# Patient Record
Sex: MALE | Race: WHITE | NOT HISPANIC OR LATINO
[De-identification: names, ages, dates, MRNs, and addresses within clinical notes are randomized per-mention and may not be internally consistent; named-entity substitution may affect disease eponyms.]

---

## 2022-06-15 ENCOUNTER — NON-APPOINTMENT (OUTPATIENT)
Age: 32
End: 2022-06-15

## 2022-06-15 PROBLEM — Z00.00 ENCOUNTER FOR PREVENTIVE HEALTH EXAMINATION: Status: ACTIVE | Noted: 2022-06-15

## 2022-06-16 ENCOUNTER — NON-APPOINTMENT (OUTPATIENT)
Age: 32
End: 2022-06-16

## 2022-06-16 ENCOUNTER — APPOINTMENT (OUTPATIENT)
Dept: INTERNAL MEDICINE | Facility: CLINIC | Age: 32
End: 2022-06-16
Payer: COMMERCIAL

## 2022-06-16 VITALS
WEIGHT: 215 LBS | SYSTOLIC BLOOD PRESSURE: 152 MMHG | DIASTOLIC BLOOD PRESSURE: 90 MMHG | HEART RATE: 82 BPM | HEIGHT: 67 IN | TEMPERATURE: 98 F | OXYGEN SATURATION: 99 % | RESPIRATION RATE: 18 BRPM | BODY MASS INDEX: 33.74 KG/M2

## 2022-06-16 VITALS — SYSTOLIC BLOOD PRESSURE: 142 MMHG | DIASTOLIC BLOOD PRESSURE: 84 MMHG

## 2022-06-16 DIAGNOSIS — F43.10 POST-TRAUMATIC STRESS DISORDER, UNSPECIFIED: ICD-10-CM

## 2022-06-16 PROCEDURE — 99204 OFFICE O/P NEW MOD 45 MIN: CPT

## 2022-06-16 PROCEDURE — 94729 DIFFUSING CAPACITY: CPT

## 2022-06-16 PROCEDURE — 94727 GAS DIL/WSHOT DETER LNG VOL: CPT

## 2022-06-16 PROCEDURE — 94060 EVALUATION OF WHEEZING: CPT

## 2022-06-16 PROCEDURE — ZZZZZ: CPT

## 2022-06-16 RX ORDER — DIAZEPAM 2 MG/1
2 TABLET ORAL
Refills: 0 | Status: ACTIVE | COMMUNITY
Start: 2022-06-16

## 2022-06-16 NOTE — HISTORY OF PRESENT ILLNESS
[FreeTextEntry1] : New PT- Pulmonary  [de-identified] : Patient is a 32- year -old male who presents to office today to establish care as a new pulmonary patient. Patient reports he has a PHM significant for anxiety, depression, and Asthma. PCP Dr. [facility] at [facility]. \par \par Patient reports he was diagnosed with Asthma as a small child. He reports his last asthma exacerbation was in 2019 after having COVID. He reports he has had COVID 3x- 2019, 7/2020, 11/2021. He reports he suffered very mild symptoms with all infections and did not require hospitalization for any of his COVID events. He is vaccinated x2 for COVID. He denies prior hospitalization in past for any reason.\par \par Currently, he is taking singular, Symbicort, and albuterol.  At one point, he reports that his Pulmonologist at [facility] told him to use his albuterol q 4 hours. When he did this, he felt very jittery. He has stopped this and only uses it as needed. He does have 1 dog (service dog) and feels that being around dog triggers his symptoms. He feels singular helps with his seasonal allergies and keeps his wheezing at a minimum. Yesterday, patient admits to some chest tightness while working outdoors. He does not have complaints today.\par \par Patient does admit he smokes medical marijuana several times a week. He has never smoked cigarettes and denies second hand smoke.\par \par Patient currently works in Film industry. Previously in  and does admit exposure to tear gas. He lives in [place].  No international travel in last year or while in . He has been to [place] and [place] (family farm) in the past year.

## 2022-06-16 NOTE — PLAN
[FreeTextEntry1] : 1. Patient will start prednisone taper as written for 10 days. \par \par 2. Patient will continue with Singulair and Symbicort for his maintenance tx. He will use albuterol only when needed and will refrain from using it q 4 hours.\par \par 3. Patient will return to office in 2 months for f/u evaluation\par \par 4. He will notify our office if he is unable to tolerate taper or if his symptoms do not improve\par \par Patient seen and evaluated with Dr. Mosquera. All questions answered

## 2022-06-16 NOTE — PHYSICAL EXAM
[No Acute Distress] : no acute distress [Well Developed] : well developed [No Lymphadenopathy] : no lymphadenopathy [Supple] : supple [No Respiratory Distress] : no respiratory distress  [No Accessory Muscle Use] : no accessory muscle use [Clear to Auscultation] : lungs were clear to auscultation bilaterally [Normal Rate] : normal rate  [Regular Rhythm] : with a regular rhythm [Normal S1, S2] : normal S1 and S2 [No Edema] : there was no peripheral edema [Soft] : abdomen soft [Non Tender] : non-tender [Non-distended] : non-distended [Normal Bowel Sounds] : normal bowel sounds [Normal Gait] : normal gait [Normal Affect] : the affect was normal [Alert and Oriented x3] : oriented to person, place, and time [de-identified] : B/L ear cerumen

## 2022-06-16 NOTE — DATA REVIEWED
[FreeTextEntry1] : PFT reviewed with patient- Patient appears to be mildly obstructed without response to bronchodilation. Mildly restricted volumes noted which may be secondary to his weight and body habitus. Diffusion capacity normal.

## 2022-06-30 ENCOUNTER — TRANSCRIPTION ENCOUNTER (OUTPATIENT)
Age: 32
End: 2022-06-30

## 2022-08-18 ENCOUNTER — APPOINTMENT (OUTPATIENT)
Dept: INTERNAL MEDICINE | Facility: CLINIC | Age: 32
End: 2022-08-18

## 2022-08-18 ENCOUNTER — NON-APPOINTMENT (OUTPATIENT)
Age: 32
End: 2022-08-18

## 2022-08-18 VITALS
TEMPERATURE: 98.1 F | DIASTOLIC BLOOD PRESSURE: 80 MMHG | RESPIRATION RATE: 18 BRPM | OXYGEN SATURATION: 97 % | WEIGHT: 210 LBS | HEIGHT: 67 IN | SYSTOLIC BLOOD PRESSURE: 130 MMHG | HEART RATE: 89 BPM | BODY MASS INDEX: 32.96 KG/M2

## 2022-08-18 DIAGNOSIS — Z23 ENCOUNTER FOR IMMUNIZATION: ICD-10-CM

## 2022-08-18 PROCEDURE — 99214 OFFICE O/P EST MOD 30 MIN: CPT | Mod: 25

## 2022-08-18 PROCEDURE — 94060 EVALUATION OF WHEEZING: CPT

## 2022-08-18 PROCEDURE — 90677 PCV20 VACCINE IM: CPT

## 2022-08-18 PROCEDURE — G0009: CPT

## 2022-08-18 RX ORDER — BUDESONIDE AND FORMOTEROL FUMARATE DIHYDRATE 160; 4.5 UG/1; UG/1
160-4.5 AEROSOL RESPIRATORY (INHALATION) TWICE DAILY
Qty: 3 | Refills: 3 | Status: DISCONTINUED | COMMUNITY
Start: 2022-06-16 | End: 2022-08-18

## 2022-08-18 RX ORDER — PREDNISONE 20 MG/1
20 TABLET ORAL
Qty: 19 | Refills: 0 | Status: DISCONTINUED | COMMUNITY
Start: 2022-06-16 | End: 2022-08-18

## 2022-08-18 NOTE — PLAN
[FreeTextEntry1] : 1. Patient will continue with Symbicort and singular daily. He will continue with albuterol PRN\par \par 2. Patient has completely stopped smoking. He will continue to refrain from this activity\par \par 3. Medications renewed at patients request \par \par 4. Prevnar 20 to be administered now\par \par 5. F/U in 6 months or sooner if needed with Dr. Mosquera

## 2022-08-18 NOTE — PHYSICAL EXAM
[No Acute Distress] : no acute distress [Well Developed] : well developed [No Lymphadenopathy] : no lymphadenopathy [Supple] : supple [No Respiratory Distress] : no respiratory distress  [No Accessory Muscle Use] : no accessory muscle use [Clear to Auscultation] : lungs were clear to auscultation bilaterally [Normal Rate] : normal rate  [Regular Rhythm] : with a regular rhythm [Normal S1, S2] : normal S1 and S2 [No Edema] : there was no peripheral edema [Soft] : abdomen soft [Non Tender] : non-tender [Non-distended] : non-distended [Normal Bowel Sounds] : normal bowel sounds [Normal Gait] : normal gait [Normal Affect] : the affect was normal [Alert and Oriented x3] : oriented to person, place, and time

## 2022-08-18 NOTE — DATA REVIEWED
[FreeTextEntry1] : Normal spirometry noted with response to bronchodilator. Improved from last visit

## 2022-08-18 NOTE — HISTORY OF PRESENT ILLNESS
[FreeTextEntry1] : Pulm F/U [de-identified] : Patient is a 32- year -old male who presents to office today for pulmonary follow up. Patient was recently seen 6/2022 in which he was experiencing some chest tightness. He did not have a structured pulmonary regimen and was over using his albuterol. Patient was placed on short prednisone taper for his symptoms. Patient was also asked to maintain Singulair and Symbicort for his maintenance therapy and use albuterol only when needed. \par \par Patient states he has stopped smoking Marijuana. He has moved his service dog out of the bed. He has not needed to use his albuterol inhaler once. He is feeling overall improved. He has been exercising without difficulty.\par \par The patient does not have any complaints today.

## 2022-09-20 ENCOUNTER — APPOINTMENT (OUTPATIENT)
Dept: INTERNAL MEDICINE | Facility: CLINIC | Age: 32
End: 2022-09-20

## 2022-09-20 DIAGNOSIS — U07.1 COVID-19: ICD-10-CM

## 2022-09-20 PROCEDURE — 99442: CPT

## 2022-09-20 NOTE — HISTORY OF PRESENT ILLNESS
[Home] : at home, [unfilled] , at the time of the visit. [Medical Office: ([facility])___] : at the medical office located in  [Verbal consent obtained from patient] : the patient, [unfilled] [FreeTextEntry8] : Patient is a 32- year -old male who presents to office today for telephonic visit. Patient has PHM significant for anxiety, depression, and asthma.\par \par +COVID 9/20/2022, 4th time getting COVID\par Symptoms started yesterday\par Experiencing PND, achy, fatigued/tired\par Coughing because of PND, clear\par No wheezing\par Meds reviewed: Paxlovid interacts negatively with Symbicort and diazepam\par Patient declined MAB enrollment, wants to treat symptomatically\par Wife at home (9 months preg)\par \par \par

## 2022-09-20 NOTE — PLAN
[FreeTextEntry1] : 1. Start prednisone 20 mg bid x 5 days. Promethazine codeine as needed for cough. To notify if no improvement in 3 days\par \par

## 2023-01-26 ENCOUNTER — NON-APPOINTMENT (OUTPATIENT)
Age: 33
End: 2023-01-26

## 2023-02-17 ENCOUNTER — RX RENEWAL (OUTPATIENT)
Age: 33
End: 2023-02-17

## 2023-05-09 ENCOUNTER — NON-APPOINTMENT (OUTPATIENT)
Age: 33
End: 2023-05-09

## 2023-05-22 ENCOUNTER — APPOINTMENT (OUTPATIENT)
Dept: INTERNAL MEDICINE | Facility: CLINIC | Age: 33
End: 2023-05-22

## 2023-09-08 ENCOUNTER — NON-APPOINTMENT (OUTPATIENT)
Age: 33
End: 2023-09-08

## 2023-09-14 ENCOUNTER — APPOINTMENT (OUTPATIENT)
Dept: INTERNAL MEDICINE | Facility: CLINIC | Age: 33
End: 2023-09-14
Payer: COMMERCIAL

## 2023-09-14 VITALS
WEIGHT: 185 LBS | RESPIRATION RATE: 18 BRPM | BODY MASS INDEX: 29.03 KG/M2 | HEART RATE: 113 BPM | OXYGEN SATURATION: 98 % | HEIGHT: 67 IN | TEMPERATURE: 99.3 F | DIASTOLIC BLOOD PRESSURE: 80 MMHG | SYSTOLIC BLOOD PRESSURE: 120 MMHG

## 2023-09-14 PROCEDURE — 99214 OFFICE O/P EST MOD 30 MIN: CPT

## 2023-09-14 RX ORDER — PREDNISONE 20 MG/1
20 TABLET ORAL
Qty: 10 | Refills: 0 | Status: DISCONTINUED | COMMUNITY
Start: 2022-09-20 | End: 2023-09-14

## 2023-09-14 RX ORDER — PROMETHAZINE HYDROCHLORIDE AND DEXTROMETHORPHAN HYDROBROMIDE ORAL SOLUTION 15; 6.25 MG/5ML; MG/5ML
6.25-15 SOLUTION ORAL
Qty: 300 | Refills: 0 | Status: DISCONTINUED | COMMUNITY
Start: 2023-05-09 | End: 2023-09-14

## 2023-09-14 RX ORDER — ALBUTEROL SULFATE 90 UG/1
108 (90 BASE) INHALANT RESPIRATORY (INHALATION)
Qty: 3 | Refills: 3 | Status: DISCONTINUED | COMMUNITY
Start: 2022-06-13 | End: 2023-09-14

## 2023-09-14 RX ORDER — PROMETHAZINE HYDROCHLORIDE AND CODEINE PHOSPHATE 6.25; 1 MG/5ML; MG/5ML
6.25-1 SOLUTION ORAL
Qty: 300 | Refills: 0 | Status: DISCONTINUED | COMMUNITY
Start: 2022-09-20 | End: 2023-09-14

## 2023-10-26 ENCOUNTER — APPOINTMENT (OUTPATIENT)
Dept: INTERNAL MEDICINE | Facility: CLINIC | Age: 33
End: 2023-10-26

## 2023-11-24 ENCOUNTER — NON-APPOINTMENT (OUTPATIENT)
Age: 33
End: 2023-11-24

## 2023-11-30 ENCOUNTER — TRANSCRIPTION ENCOUNTER (OUTPATIENT)
Age: 33
End: 2023-11-30

## 2024-03-11 ENCOUNTER — RX RENEWAL (OUTPATIENT)
Age: 34
End: 2024-03-11

## 2024-03-11 RX ORDER — ALBUTEROL SULFATE 90 UG/1
108 (90 BASE) INHALANT RESPIRATORY (INHALATION)
Qty: 3 | Refills: 5 | Status: ACTIVE | COMMUNITY
Start: 2023-09-14 | End: 1900-01-01

## 2024-04-04 ENCOUNTER — APPOINTMENT (OUTPATIENT)
Dept: INTERNAL MEDICINE | Facility: CLINIC | Age: 34
End: 2024-04-04
Payer: COMMERCIAL

## 2024-04-04 VITALS
OXYGEN SATURATION: 98 % | RESPIRATION RATE: 18 BRPM | HEIGHT: 67 IN | TEMPERATURE: 98.3 F | WEIGHT: 206 LBS | HEART RATE: 110 BPM | BODY MASS INDEX: 32.33 KG/M2

## 2024-04-04 DIAGNOSIS — R05.9 COUGH, UNSPECIFIED: ICD-10-CM

## 2024-04-04 DIAGNOSIS — J45.40 MODERATE PERSISTENT ASTHMA, UNCOMPLICATED: ICD-10-CM

## 2024-04-04 DIAGNOSIS — J45.909 UNSPECIFIED ASTHMA, UNCOMPLICATED: ICD-10-CM

## 2024-04-04 DIAGNOSIS — J30.2 OTHER SEASONAL ALLERGIC RHINITIS: ICD-10-CM

## 2024-04-04 PROCEDURE — 94729 DIFFUSING CAPACITY: CPT

## 2024-04-04 PROCEDURE — 99215 OFFICE O/P EST HI 40 MIN: CPT | Mod: 25

## 2024-04-04 PROCEDURE — 94060 EVALUATION OF WHEEZING: CPT

## 2024-04-04 PROCEDURE — ZZZZZ: CPT

## 2024-04-04 PROCEDURE — 94727 GAS DIL/WSHOT DETER LNG VOL: CPT

## 2024-04-04 RX ORDER — TIRZEPATIDE 2.5 MG/.5ML
2.5 INJECTION, SOLUTION SUBCUTANEOUS
Refills: 0 | Status: ACTIVE | COMMUNITY

## 2024-04-04 NOTE — PLAN
[FreeTextEntry1] : 1. Continue with medical regimen as outlined above, including Symbicort and Singulair on a daily basis.  2.  Continue with routine medical follow-up with his physicians at the [facility].  3.  Albuterol 30 to 60 minutes prior to exercise and as needed  4.  Follow-up with myself in 1 year with full pulmonary function testing.

## 2024-04-04 NOTE — HISTORY OF PRESENT ILLNESS
[FreeTextEntry1] : The patient presents today for a comprehensive pulmonary evaluation.  [de-identified] : Mr. SERRA is feeling relatively well at this time. The patient has a history of mild persistent asthma, and is maintained on Symbicort and singulair 10 mg daily. There have been no exacerbations as of late.  There has been no cough, sputum production, or wheezing. The patient does report that he uses his albuterol inhaler prior to working out.   The patient reports that he has been exercising 2-3 times per week by doing crossfit. There has been no chest pain, shortness of breath, or palpitations while exercising. He was recently started on a regimen of zepbound 2.5 mg/0.5 mmL, by his physician at the [facility].  He reports that he has lost 20 pounds since starting this medication. There have been no other acute constitutional symptoms. He comes in for this assessment.

## 2024-04-04 NOTE — PHYSICAL EXAM
[No Acute Distress] : no acute distress [Well Nourished] : well nourished [Well Developed] : well developed [Well-Appearing] : well-appearing [Normal Sclera/Conjunctiva] : normal sclera/conjunctiva [EOMI] : extraocular movements intact [Normal Outer Ear/Nose] : the outer ears and nose were normal in appearance [Normal Oropharynx] : the oropharynx was normal [No JVD] : no jugular venous distention [Supple] : supple [No Respiratory Distress] : no respiratory distress  [No Accessory Muscle Use] : no accessory muscle use [Clear to Auscultation] : lungs were clear to auscultation bilaterally [Normal Rate] : normal rate  [Regular Rhythm] : with a regular rhythm [Normal S1, S2] : normal S1 and S2 [No Murmur] : no murmur heard [No Edema] : there was no peripheral edema [No Extremity Clubbing/Cyanosis] : no extremity clubbing/cyanosis [Soft] : abdomen soft [Non Tender] : non-tender [Non-distended] : non-distended [No Masses] : no abdominal mass palpated [No HSM] : no HSM [Normal Bowel Sounds] : normal bowel sounds [Normal Posterior Cervical Nodes] : no posterior cervical lymphadenopathy [Normal Anterior Cervical Nodes] : no anterior cervical lymphadenopathy [No CVA Tenderness] : no CVA  tenderness [No Rash] : no rash [Coordination Grossly Intact] : coordination grossly intact [Normal Gait] : normal gait [Normal Affect] : the affect was normal [Normal Insight/Judgement] : insight and judgment were intact

## 2024-04-04 NOTE — DATA REVIEWED
[FreeTextEntry1] : A pulmonary function test is performed.  Lung volumes are within normal limits.  Lung mechanics reveal a mild decrease in flow rates.  Mild bronchodilator reactivity is demonstrated at this time.  The DLCO and saturation are maintained.  This represents a mild degree of obstruction with airway reactivity.  This is consistent with the patient's clinical diagnosis of mild asthma.  When compared to the prior study, the flow rates have improved slightly.

## 2024-04-08 ENCOUNTER — RX RENEWAL (OUTPATIENT)
Age: 34
End: 2024-04-08

## 2024-04-08 RX ORDER — MONTELUKAST 10 MG/1
10 TABLET, FILM COATED ORAL DAILY
Qty: 90 | Refills: 3 | Status: ACTIVE | COMMUNITY
Start: 2022-06-16 | End: 1900-01-01

## 2024-06-05 RX ORDER — BUDESONIDE AND FORMOTEROL FUMARATE DIHYDRATE 160; 4.5 UG/1; UG/1
160-4.5 AEROSOL RESPIRATORY (INHALATION)
Qty: 3 | Refills: 3 | Status: ACTIVE | COMMUNITY
Start: 2022-06-01 | End: 1900-01-01

## 2024-10-31 ENCOUNTER — APPOINTMENT (OUTPATIENT)
Dept: INTERNAL MEDICINE | Facility: CLINIC | Age: 34
End: 2024-10-31

## 2025-02-21 ENCOUNTER — RX RENEWAL (OUTPATIENT)
Age: 35
End: 2025-02-21

## 2025-05-16 ENCOUNTER — APPOINTMENT (OUTPATIENT)
Dept: INTERNAL MEDICINE | Facility: CLINIC | Age: 35
End: 2025-05-16

## 2025-06-02 ENCOUNTER — RX RENEWAL (OUTPATIENT)
Age: 35
End: 2025-06-02